# Patient Record
Sex: FEMALE
[De-identification: names, ages, dates, MRNs, and addresses within clinical notes are randomized per-mention and may not be internally consistent; named-entity substitution may affect disease eponyms.]

---

## 2022-12-07 PROBLEM — Z00.00 ENCOUNTER FOR PREVENTIVE HEALTH EXAMINATION: Status: ACTIVE | Noted: 2022-12-07

## 2022-12-08 ENCOUNTER — APPOINTMENT (OUTPATIENT)
Dept: ORTHOPEDIC SURGERY | Facility: CLINIC | Age: 52
End: 2022-12-08

## 2022-12-08 VITALS — WEIGHT: 126.4 LBS | HEIGHT: 61 IN | BODY MASS INDEX: 23.86 KG/M2

## 2022-12-08 DIAGNOSIS — E78.00 PURE HYPERCHOLESTEROLEMIA, UNSPECIFIED: ICD-10-CM

## 2022-12-08 DIAGNOSIS — Z78.9 OTHER SPECIFIED HEALTH STATUS: ICD-10-CM

## 2022-12-08 DIAGNOSIS — I10 ESSENTIAL (PRIMARY) HYPERTENSION: ICD-10-CM

## 2022-12-08 PROCEDURE — 20550 NJX 1 TENDON SHEATH/LIGAMENT: CPT

## 2022-12-08 PROCEDURE — 99203 OFFICE O/P NEW LOW 30 MIN: CPT | Mod: 25

## 2022-12-08 NOTE — PHYSICAL EXAM
[Bilateral] : hand bilaterally [FreeTextEntry3] : R hand: \par Mild swelling \par Tender 1st A1 pulley \par Decreased thumb ROM \par +thumb triggering\par \par L hand: \par Mild swelling \par Tender 1st A1 pulley \par Decreased thumb ROM \par +thumb triggering\par

## 2022-12-08 NOTE — HISTORY OF PRESENT ILLNESS
[Gradual] : gradual [5] : 5 [3] : 3 [Dull/Aching] : dull/aching [Radiating] : radiating [Sharp] : sharp [Throbbing] : throbbing [Household chores] : household chores [Work] : work [Meds] : meds [Full time] : Work status: full time [de-identified] : 52 year old RHD F with BILAT THUMB pain, stiffness, swelling and locking since Thanksgiving. L>R. No traumatic event. Saw her PMD, Dr. Mustapha Sharpe.given a medicine ? name that she has taken x 5 days without relief. Pain with hand motion. 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

## 2022-12-08 NOTE — PROCEDURE
[Tendon Sheath] : tendon sheath [Left] : of the left [1st] : 1st trigger finger [___ cc    1%] : Lidocaine ~Vcc of 1%  [___ cc    10mg] : Triamcinolone (Kenalog) ~Vcc of 10 mg  [] : Patient tolerated procedure well [Call if redness, pain or fever occur] : call if redness, pain or fever occur [Apply ice for 15min out of every hour for the next 12-24 hours as tolerated] : apply ice for 15 minutes out of every hour for the next 12-24 hours as tolerated [Patient was advised to rest the joint(s) for ____ days] : patient was advised to rest the joint(s) for [unfilled] days

## 2022-12-29 ENCOUNTER — APPOINTMENT (OUTPATIENT)
Dept: ORTHOPEDIC SURGERY | Facility: CLINIC | Age: 52
End: 2022-12-29

## 2022-12-29 VITALS — BODY MASS INDEX: 23.79 KG/M2 | WEIGHT: 126 LBS | HEIGHT: 61 IN

## 2022-12-29 PROCEDURE — 20550 NJX 1 TENDON SHEATH/LIGAMENT: CPT

## 2022-12-29 PROCEDURE — 99213 OFFICE O/P EST LOW 20 MIN: CPT | Mod: 25

## 2022-12-29 NOTE — HISTORY OF PRESENT ILLNESS
[3] : 3 [5] : 5 [Shooting] : shooting [Throbbing] : throbbing [Intermittent] : intermittent [Work] : work [Nothing helps with pain getting better] : Nothing helps with pain getting better [Full time] : Work status: full time [de-identified] : 52 year old female followed for b/l trigger thumb. She is 3 weeks s/l LT trigger thumb CSI. Her pain is improved but she is still having triggering. SHe is c/o of tingling in the RIGHT thumb.  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

## 2023-02-20 ENCOUNTER — APPOINTMENT (OUTPATIENT)
Dept: ORTHOPEDIC SURGERY | Facility: CLINIC | Age: 53
End: 2023-02-20
Payer: COMMERCIAL

## 2023-02-20 VITALS — WEIGHT: 126 LBS | HEIGHT: 61 IN | BODY MASS INDEX: 23.79 KG/M2

## 2023-02-20 PROCEDURE — 20550 NJX 1 TENDON SHEATH/LIGAMENT: CPT | Mod: LT

## 2023-02-20 PROCEDURE — 99213 OFFICE O/P EST LOW 20 MIN: CPT | Mod: 25

## 2023-02-20 NOTE — PHYSICAL EXAM
[Right] : right hand [Left] : left hand [A1-Pulley] : A1-pulley [1st] : 1st [] : negative tinel [FreeTextEntry3] : unable to fully flex at the IP joint

## 2023-02-20 NOTE — PROCEDURE
[Tendon Sheath] : tendon sheath [Left] : of the left [1st] : 1st trigger finger [Pain] : pain [Inflammation] : inflammation [Alcohol] : alcohol [Ethyl Chloride sprayed topically] : ethyl chloride sprayed topically [Sterile technique used] : sterile technique used [___ cc    1%] : Lidocaine ~Vcc of 1%  [___ cc    10mg] : Triamcinolone (Kenalog) ~Vcc of 10 mg  [Risks, benefits, alternatives discussed / Verbal consent obtained] : the risks benefits, and alternatives have been discussed, and verbal consent was obtained

## 2023-02-20 NOTE — DISCUSSION/SUMMARY
[de-identified] : Discussed the nature of the diagnosis and risk and benefits of different modalities of treatment.\par SHe would like a CSI for the LT trigger thumb.\par She will start night time splinting. \par RTO 4 weeks.

## 2023-02-20 NOTE — HISTORY OF PRESENT ILLNESS
[2] : 2 [Dull/Aching] : dull/aching [Injection therapy] : injection therapy [Full time] : Work status: full time [de-identified] : 53 year old female followed for b/l trigger thumbs. She is 2 months s/p RT trigger thumb CSI. She is reports numbness in her hands, she states she has had this numbness and pain for a while.  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

## 2023-03-20 ENCOUNTER — APPOINTMENT (OUTPATIENT)
Dept: ORTHOPEDIC SURGERY | Facility: CLINIC | Age: 53
End: 2023-03-20
Payer: COMMERCIAL

## 2023-03-20 VITALS — WEIGHT: 126 LBS | HEIGHT: 61 IN | BODY MASS INDEX: 23.79 KG/M2

## 2023-03-20 DIAGNOSIS — M65.311 TRIGGER THUMB, RIGHT THUMB: ICD-10-CM

## 2023-03-20 DIAGNOSIS — M65.312 TRIGGER THUMB, RIGHT THUMB: ICD-10-CM

## 2023-03-20 DIAGNOSIS — G56.01 CARPAL TUNNEL SYNDROME, RIGHT UPPER LIMB: ICD-10-CM

## 2023-03-20 DIAGNOSIS — G56.02 CARPAL TUNNEL SYNDROME, LEFT UPPER LIMB: ICD-10-CM

## 2023-03-20 PROCEDURE — 20550 NJX 1 TENDON SHEATH/LIGAMENT: CPT

## 2023-03-20 PROCEDURE — 99213 OFFICE O/P EST LOW 20 MIN: CPT | Mod: 25

## 2023-03-20 NOTE — HISTORY OF PRESENT ILLNESS
[5] : 5 [3] : 3 [Shooting] : shooting [Tightness] : tightness [de-identified] : 53 year old female followed for b/l trigger thumbs and b/l CTS. SHe has been night time splinting. She had improvement in her symptoms until this morning when she awoke with numbness. 4 weeks s/p LT trigger CSI. Decreased tenderness, still with clicking.  [] : no [FreeTextEntry1] : bilateral thumbs/ hands [FreeTextEntry6] : stiffness, numbness [de-identified] : over use [de-identified] : wrist splint at night, has CSI in left thumb 2/20/23 with minimal relief

## 2023-03-20 NOTE — PHYSICAL EXAM
[Right] : right hand [1st] : 1st [A1-Pulley] : A1-pulley [] : no erythema [FreeTextEntry3] : unable to fully extend the RT thumb

## 2023-03-20 NOTE — DISCUSSION/SUMMARY
[de-identified] : Discussed the nature of the diagnosis and risk and benefits of different modalities of treatment.\par She will continue to night time splint. \par Discussed conservative management vs CSI. \par Pt would like a CSI for the RT trigger thumb. \par Done today and tolerated well.\par All questions answered.\par

## 2023-04-17 ENCOUNTER — APPOINTMENT (OUTPATIENT)
Dept: ORTHOPEDIC SURGERY | Facility: CLINIC | Age: 53
End: 2023-04-17